# Patient Record
Sex: MALE | Race: WHITE | NOT HISPANIC OR LATINO | Employment: PART TIME | URBAN - METROPOLITAN AREA
[De-identification: names, ages, dates, MRNs, and addresses within clinical notes are randomized per-mention and may not be internally consistent; named-entity substitution may affect disease eponyms.]

---

## 2018-07-06 ENCOUNTER — TRANSFERRED RECORDS (OUTPATIENT)
Dept: HEALTH INFORMATION MANAGEMENT | Facility: CLINIC | Age: 58
End: 2018-07-06

## 2018-07-06 ENCOUNTER — MEDICAL CORRESPONDENCE (OUTPATIENT)
Dept: HEALTH INFORMATION MANAGEMENT | Facility: CLINIC | Age: 58
End: 2018-07-06

## 2018-07-25 DIAGNOSIS — H53.2 DOUBLE VISION: Primary | ICD-10-CM

## 2018-07-26 ENCOUNTER — OFFICE VISIT (OUTPATIENT)
Dept: OPHTHALMOLOGY | Facility: CLINIC | Age: 58
End: 2018-07-26
Attending: OPHTHALMOLOGY
Payer: MEDICARE

## 2018-07-26 DIAGNOSIS — H53.2 DOUBLE VISION: ICD-10-CM

## 2018-07-26 DIAGNOSIS — H50.89 TYPE 1 DUANE RETRACTION SYNDROME: Primary | ICD-10-CM

## 2018-07-26 PROCEDURE — 92060 SENSORIMOTOR EXAMINATION: CPT | Mod: ZF | Performed by: OPHTHALMOLOGY

## 2018-07-26 PROCEDURE — G0463 HOSPITAL OUTPT CLINIC VISIT: HCPCS | Mod: 25,ZF

## 2018-07-26 RX ORDER — PROPRANOLOL HYDROCHLORIDE 10 MG/1
10 TABLET ORAL
COMMUNITY
Start: 2018-06-26

## 2018-07-26 RX ORDER — GABAPENTIN 300 MG/1
300 CAPSULE ORAL
COMMUNITY
Start: 2018-06-26

## 2018-07-26 RX ORDER — BETAMETHASONE VALERATE 1.2 MG/G
CREAM TOPICAL
COMMUNITY
Start: 2018-06-26

## 2018-07-26 RX ORDER — LYSINE HCL 500 MG
TABLET ORAL
COMMUNITY

## 2018-07-26 RX ORDER — FLUOCINOLONE ACETONIDE 0.1 MG/ML
SOLUTION TOPICAL
COMMUNITY
Start: 2018-07-16

## 2018-07-26 RX ORDER — LORAZEPAM 0.5 MG/1
TABLET ORAL
COMMUNITY
Start: 2018-06-18

## 2018-07-26 RX ORDER — GEMFIBROZIL 600 MG/1
TABLET, FILM COATED ORAL
COMMUNITY
Start: 2018-05-17

## 2018-07-26 ASSESSMENT — VISUAL ACUITY
OS_SC: 20/30
OD_SC: 20/25
METHOD: SNELLEN - LINEAR
OS_SC+: -2

## 2018-07-26 ASSESSMENT — TONOMETRY
IOP_METHOD: ICARE
OD_IOP_MMHG: 10
OS_IOP_MMHG: 12

## 2018-07-26 ASSESSMENT — CUP TO DISC RATIO
OS_RATIO: 0.6
OD_RATIO: 0.5

## 2018-07-26 ASSESSMENT — CONF VISUAL FIELD
OD_NORMAL: 1
OS_NORMAL: 1

## 2018-07-26 ASSESSMENT — SLIT LAMP EXAM - LIDS
COMMENTS: NORMAL
COMMENTS: NORMAL

## 2018-07-26 NOTE — MR AVS SNAPSHOT
After Visit Summary   2018    Yemi Chapman    MRN: [id number]           Patient Information     Date Of Birth          1960        Visit Information        Provider Department      2018 1:30 PM [facility]        Today's Diagnoses     Type 1 Duane retraction syndrome    -  1    Double vision           Follow-ups after your visit        Who to contact     Please call your clinic at [phone] to:    Ask questions about your health    Make or cancel appointments    Discuss your medicines    Learn about your test results    Speak to your doctor            Additional Information About Your Visit        MyChart Information     [employer]ATS is an electronic gateway that provides easy, online access to your medical records. With BATS, you can request a clinic appointment, read your test results, renew a prescription or communicate with your care team.     To sign up for Res[employer] visit the website at [email or web address]   You will be asked to enter the access code listed below, as well as some personal information. Please follow the directions to create your username and password.     Your access code is: [email or web address]  Expires: 10/10/2018  6:30 AM     Your access code will  in 90 days. If you need help or a new code, please contact your [facility] or call [phone] for assistance.        Care EveryWhere ID     This is your Care EveryWhere ID. This could be used by other organizations to access your [place] medical records  [id number]         Blood Pressure from Last 3 Encounters:   No data found for BP    Weight from Last 3 Encounters:   No data found for Wt              We Performed the Following     IOP Measurement     Sensorimotor        Primary Care Provider    None Specified       No primary provider on file.        Equal Access to Services     SARAH CHAN : tyrone Donovan,  caryn denisaldalila dianeapril harrietin hayaan adeeg kharash la'aan ah. So [facility] [phone].    ATENCIÓN: Si ragini gupta, tiene a zamarripa disposición servicios gratuitos de asistencia lingüística. Mercedez al [phone].    We comply with applicable federal civil rights laws and [place] laws. We do not discriminate on the basis of race, color, national origin, age, disability, sex, sexual orientation, or gender identity.            Thank you!     Thank you for choosing [facility]  for your care. Our goal is always to provide you with excellent care. Hearing back from our patients is one way we can continue to improve our services. Please take a few minutes to complete the written survey that you may receive in the mail after your visit with us. Thank you!             Your Updated Medication List - Protect others around you: Learn how to safely use, store and throw away your medicines at www.disposemymeds.org.          This list is accurate as of 7/26/18 11:59 PM.  Always use your most recent med list.                   Brand Name Dispense Instructions for use Diagnosis    betamethasone valerate 0.1 % cream    VALISONE          CALCIUM 600+D PLUS MINERALS 600-400 MG-UNIT Tabs           fluocinolone 0.01 % solution    SYNALAR     Apply topically, twice daily to affected area.        gabapentin 300 MG capsule    NEURONTIN     Take 300 mg by mouth        gemfibrozil 600 MG tablet    LOPID     TAKE 1 TABLET BY MOUTH ONCE DAILY 30 MINUTES AFTER MEAL        LORazepam 0.5 MG tablet    ATIVAN     TAKE 1-2 TABLETS BY MOUTH THREE TIMES DAILY AS NEEDED FOR ANXIETY        propranolol 10 MG tablet    INDERAL     Take 10 mg by mouth

## 2018-07-26 NOTE — PROGRESS NOTES
1. 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  Patient has some facultative suppression under binocular conditions and has binocular fusion with a chin up, left head turn compensatory head position.  Strabismus surgery could reduce his compensatory head position and bring him closer to fusion near primary gaze.  Patient wishes to consider strabismus surgery and will let us know in the future if he wishes to proceed.    Yemi Chapman is a pleasant 58 year old White male who presents to my neuro-ophthalmology clinic today for strabismus evaluation.  He reports his left eye has never turned out past midline since birth, and he thinks it's from a pinched nerve.  He does have double vision, but he can make his vision single if he tilts his head slightly up and to the left.  Over the past 1-2 years he's noticed he's had to tilt his head more to bring the images together.  He's noticed occasional double vision while driving or watching TV, as if his left eye is drifting in farther that usual.  Denies headaches. He has never had prism glasses.  Denies prior strabismus surgery.  Has had LASIK in both eyes.  No other ocular history.  Family history significant for grandfather with glaucoma. No family history of strabismus.     Visual acuity is 20/25 in the right eye and 20/30 in the left eye.  Pupils react briskly to light without afferent pupillary defect.  Color vision and visual fields to confrontation are full.  Strabismus exam is notable for esotropia of 16 prism diopters in primary gaze, worse on left gaze and right hypotropia of 8 prism diopters, worse on left gaze.  Motility shows a complete lack of abduction in the left eye with an upshoot of the left eye in attempted abduction. Slit lamp exam and dilated fundus exam were unremarkable bilaterally. Cranial nerves V1-3, 7,8,9,11, and 12 were normal bilaterally.    We discussed in detail the risks, benefits, and alternatives of eye  muscle correction surgery including the very rare risk of death or serious morbidity from a general anesthesia complication and the rare risk of severe vision loss in the operative eye(s) secondary to retinal detachment or endophthalmitis.  We discussed more likely sub-optimal outcomes including the unanticipated need for additional strabismus surgery.  Finally the patient was aware that prisms glasses may be required to optimize single binocular vision following surgery.    After a thorough discussion of these risks, the patient is considering surgery for the future.  He would like to keep his options open for surgery.  Informed patient that strabismus measurements would be needed within six months of the surgery date, so if he decides to proceed with surgery more than six months from now, he will need to return for repeat measurements.    We would consider a right medial rectus recession and left superior rectus recession on fixed suture should we proceed.       Complete documentation of historical and exam elements from today's encounter can be found in the full encounter summary report (not reduplicated in this progress note).  I personally obtained the chief complaint(s) and history of present illness.  I confirmed and edited as necessary the review of systems, past medical/surgical history, family history, social history, and examination findings as documented by others; and I examined the patient myself.  I personally reviewed the relevant tests, images, and reports as documented above.  I formulated and edited as necessary the assessment and plan and discussed the findings and management plan with the patient and family.  I personally reviewed the ophthalmic test(s) associated with this encounter, agree with the interpretation(s) as documented by the resident/fellow, and have edited the corresponding report(s) as necessary.     MD Mony Grissom, MS4

## 2018-07-26 NOTE — NURSING NOTE
Chief Complaints and History of Present Illnesses   Patient presents with     Neurologic Problem     diplopia     HPI    Symptoms:              Comments:  Yemi is a 59 year old male presenting with complaints of:    1. Diplopia  History of longstanding hypertropia and esotropia  Left eye does not abduct  Interested in strabismus surgery  History of left amblyopia and non-compliance with patching  Diplopia improve with left turn head posture.   Intermittent diplopia worse at distance and when fatigued.   No brain MRI done recently.     Marti Murillomamunira CO 1:15 PM July 26, 2018

## 2018-07-26 NOTE — LETTER
2018    RE: Yemi Chapman  : 1960  MRN: [id number]    Dear Dr. Andino,    Thank you for referring your patient, Yemi Chapman, to my neuro-ophthalmology clinic recently.  After a thorough neuro-ophthalmic history and examination, I came to the following conclusions:     1. 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  Patient has some facultative suppression under binocular conditions and has binocular fusion with a chin up, left head turn compensatory head position.  Strabismus surgery could reduce his compensatory head position and bring him closer to fusion near primary gaze.  Patient wishes to consider strabismus surgery and will let us know in the future if he wishes to proceed.    Yemi Chapman is a pleasant 58 year old White male who presents to my neuro-ophthalmology clinic today for strabismus evaluation.  He reports his left eye has never turned out past midline since birth, and he thinks it's from a pinched nerve.  He does have double vision, but he can make his vision single if he tilts his head slightly up and to the left.  Over the past 1-2 years he's noticed he's had to tilt his head more to bring the images together.  He's noticed occasional double vision while driving or watching TV, as if his left eye is drifting in farther that usual.  Denies headaches. He has never had prism glasses.  Denies prior strabismus surgery.  Has had LASIK in both eyes.  No other ocular history.  Family history significant for grandfather with glaucoma. No family history of strabismus.     Visual acuity is 20/25 in the right eye and 20/30 in the left eye.  Pupils react briskly to light without afferent pupillary defect.  Color vision and visual fields to confrontation are full.  Strabismus exam is notable for esotropia of 16 prism diopters in primary gaze, worse on left gaze and right hypotropia of 8 prism diopters, worse on left gaze.  Motility shows a complete  lack of abduction in the left eye with an upshoot of the left eye in attempted abduction. Slit lamp exam and dilated fundus exam were unremarkable bilaterally. Cranial nerves V1-3, 7,8,9,11, and 12 were normal bilaterally.    We discussed in detail the risks, benefits, and alternatives of eye muscle correction surgery including the very rare risk of death or serious morbidity from a general anesthesia complication and the rare risk of severe vision loss in the operative eye(s) secondary to retinal detachment or endophthalmitis.  We discussed more likely sub-optimal outcomes including the unanticipated need for additional strabismus surgery.  Finally the patient was aware that prisms glasses may be required to optimize single binocular vision following surgery.    After a thorough discussion of these risks, the patient is considering surgery for the future.  He would like to keep his options open for surgery.  Informed patient that strabismus measurements would be needed within six months of the surgery date, so if he decides to proceed with surgery more than six months from now, he will need to return for repeat measurements.    We would consider a right medial rectus recession and left superior rectus recession on fixed suture should we proceed.    Again, thank you for trusting me with the care of your patient.  For further exam details, please feel free to contact our office for additional records.  If you wish to contact me regarding this patient please email me at [email or web address] or give my clinic a call to arrange a phone conversation.    Sincerely,    Jayy Julian MD  , Neuro-Ophthalmology and Adult Strabismus  Department of Ophthalmology and Visual Neurosciences  [facility]    DX: Duane retraction syndrome

## 2021-12-27 ENCOUNTER — MEDICAL CORRESPONDENCE (OUTPATIENT)
Dept: HEALTH INFORMATION MANAGEMENT | Facility: CLINIC | Age: 61
End: 2021-12-27
Payer: MEDICARE

## 2022-06-28 ENCOUNTER — ANCILLARY PROCEDURE (OUTPATIENT)
Dept: NUCLEAR MEDICINE | Facility: CLINIC | Age: 62
End: 2022-06-28
Attending: PSYCHIATRY & NEUROLOGY
Payer: MEDICARE

## 2022-06-28 DIAGNOSIS — G20.A1 PARKINSON DISEASE (H): ICD-10-CM

## 2022-06-28 PROCEDURE — 78803 RP LOCLZJ TUM SPECT 1 AREA: CPT | Performed by: STUDENT IN AN ORGANIZED HEALTH CARE EDUCATION/TRAINING PROGRAM

## 2022-06-28 PROCEDURE — A9584 IODINE I-123 IOFLUPANE: HCPCS | Performed by: STUDENT IN AN ORGANIZED HEALTH CARE EDUCATION/TRAINING PROGRAM
